# Patient Record
Sex: MALE | Race: WHITE | NOT HISPANIC OR LATINO | URBAN - METROPOLITAN AREA
[De-identification: names, ages, dates, MRNs, and addresses within clinical notes are randomized per-mention and may not be internally consistent; named-entity substitution may affect disease eponyms.]

---

## 2024-02-07 ENCOUNTER — TELEPHONE (OUTPATIENT)
Dept: GASTROENTEROLOGY | Facility: CLINIC | Age: 34
End: 2024-02-07
Payer: COMMERCIAL

## 2024-02-07 NOTE — TELEPHONE ENCOUNTER
Lalita from [facility] called the office to get an update on this patients referral, she verified his information and I advised that we had received the referral and had to get clarification for our provider on scheduling so we have received the guidance and I stated that I could schedule him for an appointment with her now or we can start that process after clinic gets done today. Lalita stated that it was fine for us to reach out to him.

## 2024-02-13 NOTE — TELEPHONE ENCOUNTER
"Attempted to contact the patient in regards to the referral we received from JOHANNE SOLANO for \"Hyperbilirubinemia\" \"Hepatitis\" \"ETOHism\". Patient did not answer and I was unable to leave a voicemail. Deferring out two days and then I'll try contacting the patient for the second time.   "

## 2024-02-15 NOTE — TELEPHONE ENCOUNTER
"Attempted to contact the patient for the second time in regards to the referral we received from JOHANNE SOLANO  for \"Hyperbilirubinemia\" \"Hepatitis\" \"ETOHism\". Per Dr. Ye, the patient can be scheduled for an appointment for \"elevated liver enzymes\". Patient did not answer and I was unable to leave a voicemail to request a call back.   "

## 2024-02-19 NOTE — TELEPHONE ENCOUNTER
"Attempted to contact the patient for the third time in regards to the referral we received from JOHANNE SOLANO for \"Hyperbilirubinemia\" \"Hepatitis\" \"ETOHism\". Per Dr. Ye, the patient can be scheduled for an appointment for \"elevated liver enzymes\". Patient did not answer and I was unable to leave a voicemail to request a call back.   "

## 2024-02-21 ENCOUNTER — TELEPHONE (OUTPATIENT)
Dept: GASTROENTEROLOGY | Facility: CLINIC | Age: 34
End: 2024-02-21
Payer: COMMERCIAL

## 2024-02-21 NOTE — TELEPHONE ENCOUNTER
I tried calling patient for the 4th time,no answer. Sending letter. Routing referral back to ambulatory.

## 2024-04-29 ENCOUNTER — TELEPHONE (OUTPATIENT)
Dept: GASTROENTEROLOGY | Facility: CLINIC | Age: 34
End: 2024-04-29

## 2024-04-29 NOTE — TELEPHONE ENCOUNTER
Attempted to contact Eduin Jackson 1990 regarding the appointment no show with CARLOS Vinson on 04/29/24. Patient is aware that there is a 24-hour cancellation policy and understands that a no-show letter will be mailed to them at the address on file.The call was not able to be completed due to his phone number not being in service, I have marked the patient as no show and will mail out a letter.    [facility] okay to reschedule patients appointment if he contacts the office back.